# Patient Record
Sex: FEMALE | Race: BLACK OR AFRICAN AMERICAN | NOT HISPANIC OR LATINO | URBAN - METROPOLITAN AREA
[De-identification: names, ages, dates, MRNs, and addresses within clinical notes are randomized per-mention and may not be internally consistent; named-entity substitution may affect disease eponyms.]

---

## 2020-08-21 ENCOUNTER — OFFICE VISIT (OUTPATIENT)
Dept: URBAN - METROPOLITAN AREA CLINIC 84 | Facility: CLINIC | Age: 80
End: 2020-08-21
Payer: MEDICARE

## 2020-08-21 DIAGNOSIS — R10.13 EPIGASTRIC ABDOMINAL PAIN: ICD-10-CM

## 2020-08-21 DIAGNOSIS — K58.9 IBS (IRRITABLE BOWEL SYNDROME)-C: ICD-10-CM

## 2020-08-21 DIAGNOSIS — R68.81 EARLY SATIETY: ICD-10-CM

## 2020-08-21 PROCEDURE — 99213 OFFICE O/P EST LOW 20 MIN: CPT | Performed by: INTERNAL MEDICINE

## 2020-08-21 PROCEDURE — G9903 PT SCRN TBCO ID AS NON USER: HCPCS | Performed by: INTERNAL MEDICINE

## 2020-08-21 PROCEDURE — G8427 DOCREV CUR MEDS BY ELIG CLIN: HCPCS | Performed by: INTERNAL MEDICINE

## 2020-08-21 PROCEDURE — G8417 CALC BMI ABV UP PARAM F/U: HCPCS | Performed by: INTERNAL MEDICINE

## 2020-08-21 RX ORDER — SPIRONOLACTONE 25 MG/1
TABLET ORAL
Qty: 0 | Refills: 0 | Status: ACTIVE | COMMUNITY
Start: 1900-01-01 | End: 1900-01-01

## 2020-08-21 RX ORDER — PANTOPRAZOLE SODIUM 40 MG/1
TABLET, DELAYED RELEASE ORAL
Qty: 0 | Refills: 0 | Status: ACTIVE | COMMUNITY
Start: 1900-01-01 | End: 1900-01-01

## 2020-08-21 RX ORDER — LACTULOSE 10 G/15ML
TAKE 30 MILLILITERS BY ORAL ROUTE 4 TIMES A DAY AS NEEDED SOLUTION ORAL
Qty: 1800 | Refills: 5 | Status: ACTIVE | COMMUNITY
Start: 2020-05-15 | End: 1900-01-01

## 2020-08-21 RX ORDER — CARVEDILOL 12.5 MG/1
TABLET, FILM COATED ORAL
Qty: 0 | Refills: 0 | Status: ACTIVE | COMMUNITY
Start: 1900-01-01 | End: 1900-01-01

## 2020-08-21 RX ORDER — DULOXETINE 60 MG/1
CAPSULE, DELAYED RELEASE PELLETS ORAL
Qty: 0 | Refills: 0 | Status: ACTIVE | COMMUNITY
Start: 1900-01-01 | End: 1900-01-01

## 2020-08-21 RX ORDER — TIZANIDINE HYDROCHLORIDE 4 MG/1
CAPSULE ORAL
Qty: 0 | Refills: 0 | Status: ACTIVE | COMMUNITY
Start: 1900-01-01 | End: 1900-01-01

## 2020-08-21 NOTE — HPI-TODAY'S VISIT:
Patient seen 5/2020.  She completed XRT.  She is on Chemotherapy as well.  She has been using Lactulose 3-4 times a day.  She also uses Miralax daily.  In general she is having regular BM's.  She has a daily BM.  It sometimes gives her diarrhea.  She denies LGI bleed or melena.  She still feels very full after she eats.  She has early satiety.  She has been on intermittent narcotics, but she has not taken any recently.  Her GERD is under control on PPI therapy.  She denies N/V/dysphagia.  Her DM is poorly controlled

## 2020-09-01 ENCOUNTER — TELEPHONE ENCOUNTER (OUTPATIENT)
Dept: URBAN - METROPOLITAN AREA CLINIC 84 | Facility: CLINIC | Age: 80
End: 2020-09-01

## 2020-11-20 ENCOUNTER — OFFICE VISIT (OUTPATIENT)
Dept: URBAN - METROPOLITAN AREA CLINIC 84 | Facility: CLINIC | Age: 80
End: 2020-11-20
Payer: MEDICARE

## 2020-11-20 DIAGNOSIS — K31.84 GASTROPARESIS: ICD-10-CM

## 2020-11-20 DIAGNOSIS — K58.9 IBS (IRRITABLE BOWEL SYNDROME)-C: ICD-10-CM

## 2020-11-20 PROCEDURE — G9903 PT SCRN TBCO ID AS NON USER: HCPCS | Performed by: INTERNAL MEDICINE

## 2020-11-20 PROCEDURE — G8427 DOCREV CUR MEDS BY ELIG CLIN: HCPCS | Performed by: INTERNAL MEDICINE

## 2020-11-20 PROCEDURE — 99213 OFFICE O/P EST LOW 20 MIN: CPT | Performed by: INTERNAL MEDICINE

## 2020-11-20 PROCEDURE — G8483 FLU IMM NO ADMIN DOC REA: HCPCS | Performed by: INTERNAL MEDICINE

## 2020-11-20 PROCEDURE — G8417 CALC BMI ABV UP PARAM F/U: HCPCS | Performed by: INTERNAL MEDICINE

## 2020-11-20 RX ORDER — DULOXETINE 60 MG/1
CAPSULE, DELAYED RELEASE PELLETS ORAL
Qty: 0 | Refills: 0 | Status: ACTIVE | COMMUNITY
Start: 1900-01-01 | End: 1900-01-01

## 2020-11-20 RX ORDER — LACTULOSE 10 G/15ML
TAKE 30 MILLILITERS BY ORAL ROUTE 4 TIMES A DAY AS NEEDED SOLUTION ORAL
Qty: 1800 | Refills: 5 | Status: ACTIVE | COMMUNITY
Start: 2020-05-15 | End: 1900-01-01

## 2020-11-20 RX ORDER — CARVEDILOL 12.5 MG/1
TABLET, FILM COATED ORAL
Qty: 0 | Refills: 0 | Status: ACTIVE | COMMUNITY
Start: 1900-01-01 | End: 1900-01-01

## 2020-11-20 RX ORDER — SPIRONOLACTONE 25 MG/1
TABLET ORAL
Qty: 0 | Refills: 0 | Status: ACTIVE | COMMUNITY
Start: 1900-01-01 | End: 1900-01-01

## 2020-11-20 RX ORDER — PANTOPRAZOLE SODIUM 40 MG/1
TABLET, DELAYED RELEASE ORAL
Qty: 0 | Refills: 0 | Status: ACTIVE | COMMUNITY
Start: 1900-01-01 | End: 1900-01-01

## 2020-11-20 RX ORDER — METOCLOPRAMIDE HYDROCHLORIDE 10 MG/1
1 TABLET TABLET ORAL
Qty: 120 TABLET | Refills: 2 | OUTPATIENT
Start: 2020-11-20

## 2020-11-20 RX ORDER — TIZANIDINE HYDROCHLORIDE 4 MG/1
CAPSULE ORAL
Qty: 0 | Refills: 0 | Status: ACTIVE | COMMUNITY
Start: 1900-01-01 | End: 1900-01-01

## 2020-11-20 NOTE — HPI-TODAY'S VISIT:
Patient last seen 8/2020.  She has been following up with her Oncology.  According to the patient her cancer is "doing well."  She has post prandial pain and early satiety.  She has been trying the gastroparesis diet without much improvement.  She denies N/V/GERD/dysphagia.  She denies weight loss.  She is moving her bowels well with Lactulose and Miralax.  She denies LGI bleed or melena.  Her DM has not been well controlled.  She uses hydrocodone gagan a regular basis.  Review of her GES from 9/22/2020 shows delayed gastric emptying consistent with gastroparesis

## 2021-02-25 ENCOUNTER — WEB ENCOUNTER (OUTPATIENT)
Dept: URBAN - METROPOLITAN AREA CLINIC 84 | Facility: CLINIC | Age: 81
End: 2021-02-25

## 2021-02-25 ENCOUNTER — OFFICE VISIT (OUTPATIENT)
Dept: URBAN - METROPOLITAN AREA CLINIC 84 | Facility: CLINIC | Age: 81
End: 2021-02-25
Payer: MEDICARE

## 2021-02-25 VITALS
HEIGHT: 65 IN | WEIGHT: 254.2 LBS | TEMPERATURE: 97.5 F | DIASTOLIC BLOOD PRESSURE: 69 MMHG | HEART RATE: 86 BPM | BODY MASS INDEX: 42.35 KG/M2 | SYSTOLIC BLOOD PRESSURE: 148 MMHG

## 2021-02-25 DIAGNOSIS — K58.9 IBS (IRRITABLE BOWEL SYNDROME)-C: ICD-10-CM

## 2021-02-25 DIAGNOSIS — K31.84 GASTROPARESIS: ICD-10-CM

## 2021-02-25 PROCEDURE — 99213 OFFICE O/P EST LOW 20 MIN: CPT | Performed by: INTERNAL MEDICINE

## 2021-02-25 RX ORDER — CARVEDILOL 12.5 MG/1
TABLET, FILM COATED ORAL
Qty: 0 | Refills: 0 | Status: ACTIVE | COMMUNITY
Start: 1900-01-01

## 2021-02-25 RX ORDER — TIZANIDINE HYDROCHLORIDE 4 MG/1
CAPSULE ORAL
Qty: 0 | Refills: 0 | Status: ACTIVE | COMMUNITY
Start: 1900-01-01

## 2021-02-25 RX ORDER — PANTOPRAZOLE SODIUM 40 MG/1
TABLET, DELAYED RELEASE ORAL
Qty: 0 | Refills: 0 | Status: ACTIVE | COMMUNITY
Start: 1900-01-01

## 2021-02-25 RX ORDER — SPIRONOLACTONE 25 MG/1
TABLET ORAL
Qty: 0 | Refills: 0 | Status: ACTIVE | COMMUNITY
Start: 1900-01-01

## 2021-02-25 RX ORDER — LACTULOSE 10 G/15ML
TAKE 30 MILLILITERS BY ORAL ROUTE 4 TIMES A DAY AS NEEDED SOLUTION ORAL
Qty: 1800 | Refills: 5 | Status: ACTIVE | COMMUNITY
Start: 2020-05-15

## 2021-02-25 RX ORDER — METOCLOPRAMIDE HYDROCHLORIDE 10 MG/1
1 TABLET TABLET ORAL
Qty: 120 TABLET | Refills: 2 | Status: ACTIVE | COMMUNITY
Start: 2020-11-20

## 2021-02-25 RX ORDER — DULOXETINE 60 MG/1
CAPSULE, DELAYED RELEASE PELLETS ORAL
Qty: 0 | Refills: 0 | Status: ACTIVE | COMMUNITY
Start: 1900-01-01

## 2021-02-25 NOTE — HPI-OTHER HISTORIES
Patient last seen 11/2020.  She does not feel that the Reglan has been helpful.  She denies anroexia or weight loss.  She still has an epigastric pain that increases with PO intake.  She has early satiety.  The pain decrease if she does not eat a lot.  She denies GERD/N/V/dysphagia.  She uses Lactulose QID.  It does giver her diarrhea.  She does not use it daily. She ahs not tried to decrease the Lactulose dose to prevent the diarrhea.  She is on Protonix.  Per the patient her lung cancer is in remission.  She follows with Dr. Lao

## 2021-02-25 NOTE — PHYSICAL EXAM NECK/THYROID:
normal appearance , without tenderness upon palpation , no deformities , trachea midline , Thyroid normal size , no thyroid nodules , no masses , no JVD , thyroid nontender
6

## 2021-04-08 ENCOUNTER — ERX REFILL RESPONSE (OUTPATIENT)
Dept: URBAN - METROPOLITAN AREA CLINIC 84 | Facility: CLINIC | Age: 81
End: 2021-04-08

## 2021-04-08 RX ORDER — METOCLOPRAMIDE 10 MG/1
1 TABLET TABLET ORAL
Qty: 120 | Refills: 2

## 2021-05-03 ENCOUNTER — TELEPHONE ENCOUNTER (OUTPATIENT)
Dept: URBAN - METROPOLITAN AREA CLINIC 92 | Facility: CLINIC | Age: 81
End: 2021-05-03

## 2021-06-25 ENCOUNTER — WEB ENCOUNTER (OUTPATIENT)
Dept: URBAN - METROPOLITAN AREA CLINIC 84 | Facility: CLINIC | Age: 81
End: 2021-06-25

## 2021-06-25 ENCOUNTER — OFFICE VISIT (OUTPATIENT)
Dept: URBAN - METROPOLITAN AREA CLINIC 84 | Facility: CLINIC | Age: 81
End: 2021-06-25
Payer: MEDICARE

## 2021-06-25 VITALS
HEART RATE: 60 BPM | DIASTOLIC BLOOD PRESSURE: 78 MMHG | RESPIRATION RATE: 28 BRPM | HEIGHT: 65 IN | BODY MASS INDEX: 40.82 KG/M2 | TEMPERATURE: 97.3 F | SYSTOLIC BLOOD PRESSURE: 166 MMHG | WEIGHT: 245 LBS

## 2021-06-25 DIAGNOSIS — K31.84 GASTROPARESIS: ICD-10-CM

## 2021-06-25 DIAGNOSIS — K58.9 IBS (IRRITABLE BOWEL SYNDROME)-C: ICD-10-CM

## 2021-06-25 DIAGNOSIS — R10.13 EPIGASTRIC ABDOMINAL PAIN: ICD-10-CM

## 2021-06-25 PROCEDURE — 99213 OFFICE O/P EST LOW 20 MIN: CPT | Performed by: INTERNAL MEDICINE

## 2021-06-25 RX ORDER — CARVEDILOL 12.5 MG/1
TABLET, FILM COATED ORAL
Qty: 0 | Refills: 0 | Status: ACTIVE | COMMUNITY
Start: 1900-01-01

## 2021-06-25 RX ORDER — PANTOPRAZOLE SODIUM 40 MG/1
TABLET, DELAYED RELEASE ORAL
Qty: 0 | Refills: 0 | Status: ACTIVE | COMMUNITY
Start: 1900-01-01

## 2021-06-25 RX ORDER — DICYCLOMINE HYDROCHLORIDE 20 MG/1
1- 2 TABLETS TABLET ORAL
Qty: 60 | Refills: 2 | OUTPATIENT
Start: 2021-06-25 | End: 2021-09-23

## 2021-06-25 RX ORDER — LACTULOSE 10 G/15ML
TAKE 30 MILLILITERS BY ORAL ROUTE 4 TIMES A DAY AS NEEDED SOLUTION ORAL
Qty: 1800 | Refills: 5 | Status: ACTIVE | COMMUNITY
Start: 2020-05-15

## 2021-06-25 RX ORDER — METOCLOPRAMIDE 10 MG/1
1 TABLET TABLET ORAL
Qty: 120 | Refills: 2 | Status: ACTIVE | COMMUNITY

## 2021-06-25 RX ORDER — DULOXETINE 60 MG/1
CAPSULE, DELAYED RELEASE PELLETS ORAL
Qty: 0 | Refills: 0 | Status: ACTIVE | COMMUNITY
Start: 1900-01-01

## 2021-06-25 RX ORDER — SPIRONOLACTONE 25 MG/1
TABLET ORAL
Qty: 0 | Refills: 0 | Status: ACTIVE | COMMUNITY
Start: 1900-01-01

## 2021-06-25 RX ORDER — TIZANIDINE HYDROCHLORIDE 4 MG/1
CAPSULE ORAL
Qty: 0 | Refills: 0 | Status: ACTIVE | COMMUNITY
Start: 1900-01-01

## 2021-06-25 NOTE — HPI-TODAY'S VISIT:
Patient last seen 2/2021.  She has some epiagstric pain.  She is tkaing Omeprazole BID.   She has an appetite.  She is trying to follow the gastroparesis diet.  She does not really feel like it has bene helpfu.  She denies GERD/N/V/dysphagia.  She is no longer taking Lactulose.  She uses Miralax every other day.  She moves her bowels well with this.  She denies LGI bleed or melena.

## 2021-07-07 ENCOUNTER — ERX REFILL RESPONSE (OUTPATIENT)
Dept: URBAN - METROPOLITAN AREA CLINIC 84 | Facility: CLINIC | Age: 81
End: 2021-07-07

## 2021-07-07 RX ORDER — DICYCLOMINE HYDROCHLORIDE 20 MG/1
1- 2 TABLETS ORALLY FOUR TIMES A DAY AS NEEDED 30 DAY(S) TABLET ORAL
Qty: 180 TABLET | Refills: 1 | OUTPATIENT

## 2021-07-07 RX ORDER — DICYCLOMINE HYDROCHLORIDE 20 MG/1
1- 2 TABLETS TABLET ORAL
Qty: 60 | Refills: 2 | OUTPATIENT

## 2021-08-06 ENCOUNTER — ERX REFILL RESPONSE (OUTPATIENT)
Dept: URBAN - METROPOLITAN AREA CLINIC 84 | Facility: CLINIC | Age: 81
End: 2021-08-06

## 2021-08-06 RX ORDER — DICYCLOMINE HYDROCHLORIDE 20 MG/1
1- 2 TABLETS ORALLY FOUR TIMES A DAY AS NEEDED 30 DAY(S) TABLET ORAL
Qty: 180 TABLET | Refills: 2 | OUTPATIENT

## 2021-08-06 RX ORDER — DICYCLOMINE HYDROCHLORIDE 20 MG/1
1- 2 TABLETS ORALLY FOUR TIMES A DAY AS NEEDED 30 DAY(S) TABLET ORAL
Qty: 180 TABLET | Refills: 1 | OUTPATIENT

## 2021-10-28 ENCOUNTER — OFFICE VISIT (OUTPATIENT)
Dept: URBAN - METROPOLITAN AREA CLINIC 84 | Facility: CLINIC | Age: 81
End: 2021-10-28
Payer: MEDICARE

## 2021-10-28 ENCOUNTER — WEB ENCOUNTER (OUTPATIENT)
Dept: URBAN - METROPOLITAN AREA CLINIC 84 | Facility: CLINIC | Age: 81
End: 2021-10-28

## 2021-10-28 DIAGNOSIS — K58.9 IBS (IRRITABLE BOWEL SYNDROME)-C: ICD-10-CM

## 2021-10-28 DIAGNOSIS — R10.13 EPIGASTRIC ABDOMINAL PAIN: ICD-10-CM

## 2021-10-28 DIAGNOSIS — K31.84 GASTROPARESIS: ICD-10-CM

## 2021-10-28 PROCEDURE — 99213 OFFICE O/P EST LOW 20 MIN: CPT | Performed by: INTERNAL MEDICINE

## 2021-10-28 RX ORDER — DICYCLOMINE HYDROCHLORIDE 20 MG/1
1- 2 TABLETS ORALLY FOUR TIMES A DAY AS NEEDED 30 DAY(S) TABLET ORAL
Qty: 180 TABLET | Refills: 2

## 2021-10-28 RX ORDER — TIZANIDINE HYDROCHLORIDE 4 MG/1
CAPSULE ORAL
Qty: 0 | Refills: 0 | Status: ACTIVE | COMMUNITY
Start: 1900-01-01

## 2021-10-28 RX ORDER — DULOXETINE 60 MG/1
CAPSULE, DELAYED RELEASE PELLETS ORAL
Qty: 0 | Refills: 0 | Status: ACTIVE | COMMUNITY
Start: 1900-01-01

## 2021-10-28 RX ORDER — LACTULOSE 10 G/15ML
15 ML SOLUTION ORAL
Qty: 1800 MILLILITER | Refills: 5 | OUTPATIENT
Start: 2021-10-28 | End: 2022-04-26

## 2021-10-28 RX ORDER — LACTULOSE 10 G/15ML
TAKE 30 MILLILITERS BY ORAL ROUTE 4 TIMES A DAY AS NEEDED SOLUTION ORAL
Qty: 1800 | Refills: 5 | Status: ACTIVE | COMMUNITY
Start: 2020-05-15

## 2021-10-28 RX ORDER — PANTOPRAZOLE SODIUM 40 MG/1
TABLET, DELAYED RELEASE ORAL
Qty: 0 | Refills: 0 | Status: ACTIVE | COMMUNITY
Start: 1900-01-01

## 2021-10-28 RX ORDER — SPIRONOLACTONE 25 MG/1
TABLET ORAL
Qty: 0 | Refills: 0 | Status: ACTIVE | COMMUNITY
Start: 1900-01-01

## 2021-10-28 RX ORDER — METOCLOPRAMIDE 10 MG/1
1 TABLET TABLET ORAL
Qty: 120 | Refills: 2 | Status: ON HOLD | COMMUNITY

## 2021-10-28 RX ORDER — LISINOPRIL AND HYDROCHLOROTHIAZIDE 10; 12.5 MG/1; MG/1
1 TABLET TABLET ORAL ONCE A DAY
Status: ACTIVE | COMMUNITY

## 2021-10-28 RX ORDER — CARVEDILOL 12.5 MG/1
TABLET, FILM COATED ORAL
Qty: 0 | Refills: 0 | Status: ACTIVE | COMMUNITY
Start: 1900-01-01

## 2021-10-28 RX ORDER — DICYCLOMINE HYDROCHLORIDE 20 MG/1
1- 2 TABLETS ORALLY FOUR TIMES A DAY AS NEEDED 30 DAY(S) TABLET ORAL
Qty: 180 TABLET | Refills: 2 | Status: ACTIVE | COMMUNITY

## 2021-10-28 NOTE — HPI-TODAY'S VISIT:
Patient seen 6/2021.  She has lost a lot of weight.  She is getting Chemo for her lung cancer.  She has an appetite.  She has diffuse body pains.  She uses Miralax daily but she ahs been having recurrent constipaiton.  SHe has a BM every 2-3 days.  SHe denies LGI bleed or melena.  She ran out of the Lactulose.  It was helpful when she used it.  She is still on PPI therapy. She uses Bentyl PRN.   She denies GERD/N/V/dysphagia.  She follows with Dr. Lao

## 2022-01-03 ENCOUNTER — ERX REFILL RESPONSE (OUTPATIENT)
Dept: URBAN - METROPOLITAN AREA CLINIC 84 | Facility: CLINIC | Age: 82
End: 2022-01-03

## 2022-01-03 RX ORDER — DICYCLOMINE HYDROCHLORIDE 20 MG/1
TAKE 1 TO 2 TABLETS FOUR TIMES DAILY AS NEEDED TABLET ORAL
Qty: 180 TABLET | Refills: 1 | OUTPATIENT

## 2022-01-03 RX ORDER — DICYCLOMINE HYDROCHLORIDE 20 MG/1
1- 2 TABLETS ORALLY FOUR TIMES A DAY AS NEEDED 30 DAY(S) TABLET ORAL
Qty: 180 TABLET | Refills: 2 | OUTPATIENT

## 2022-03-17 ENCOUNTER — TELEPHONE ENCOUNTER (OUTPATIENT)
Dept: URBAN - METROPOLITAN AREA CLINIC 84 | Facility: CLINIC | Age: 82
End: 2022-03-17

## 2022-03-17 RX ORDER — DICYCLOMINE HYDROCHLORIDE 20 MG/1
TAKE 1 TO 2 TABLETS FOUR TIMES DAILY AS NEEDED TABLET ORAL
Qty: 60 | Refills: 5

## 2022-05-19 ENCOUNTER — ERX REFILL RESPONSE (OUTPATIENT)
Dept: URBAN - METROPOLITAN AREA CLINIC 84 | Facility: CLINIC | Age: 82
End: 2022-05-19

## 2022-05-19 RX ORDER — DICYCLOMINE HYDROCHLORIDE 20 MG/1
TAKE 1 TO 2 TABLETS FOUR TIMES DAILY AS NEEDED TABLET ORAL
Qty: 60 TABLET | Refills: 1 | OUTPATIENT

## 2022-05-19 RX ORDER — DICYCLOMINE HYDROCHLORIDE 20 MG/1
TAKE 1 TO 2 TABLETS FOUR TIMES DAILY AS NEEDED TABLET ORAL
Qty: 60 | Refills: 5 | OUTPATIENT

## 2022-06-08 ENCOUNTER — ERX REFILL RESPONSE (OUTPATIENT)
Dept: URBAN - METROPOLITAN AREA CLINIC 84 | Facility: CLINIC | Age: 82
End: 2022-06-08

## 2022-06-08 RX ORDER — DICYCLOMINE HYDROCHLORIDE 20 MG/1
TAKE 1 TO 2 TABLETS FOUR TIMES DAILY AS NEEDED TABLET ORAL
Qty: 60 TABLET | Refills: 1 | OUTPATIENT

## 2022-06-27 ENCOUNTER — ERX REFILL RESPONSE (OUTPATIENT)
Dept: URBAN - METROPOLITAN AREA CLINIC 84 | Facility: CLINIC | Age: 82
End: 2022-06-27

## 2022-06-27 RX ORDER — DICYCLOMINE HYDROCHLORIDE 20 MG/1
TAKE 1 TO 2 TABLETS FOUR TIMES DAILY AS NEEDED TABLET ORAL
Qty: 60 TABLET | Refills: 0 | OUTPATIENT

## 2022-06-27 RX ORDER — DICYCLOMINE HYDROCHLORIDE 20 MG/1
TAKE 1 TO 2 TABLETS FOUR TIMES DAILY AS NEEDED TABLET ORAL
Qty: 60 TABLET | Refills: 1 | OUTPATIENT

## 2022-07-06 ENCOUNTER — ERX REFILL RESPONSE (OUTPATIENT)
Dept: URBAN - METROPOLITAN AREA CLINIC 84 | Facility: CLINIC | Age: 82
End: 2022-07-06

## 2022-07-06 RX ORDER — LACTULOSE 10 G/15ML
TAKE 15 MILLILITERS FOUR TIMES DAILY AS NEEDED SOLUTION ORAL
Qty: 1800 MILLILITER | Refills: 0 | OUTPATIENT

## 2022-07-06 RX ORDER — LACTULOSE 10 G/15ML
TAKE 30 MILLILITERS BY ORAL ROUTE 4 TIMES A DAY AS NEEDED SOLUTION ORAL
Qty: 1800 | Refills: 5 | OUTPATIENT

## 2022-07-21 ENCOUNTER — OFFICE VISIT (OUTPATIENT)
Dept: URBAN - METROPOLITAN AREA CLINIC 84 | Facility: CLINIC | Age: 82
End: 2022-07-21
Payer: MEDICARE

## 2022-07-21 ENCOUNTER — DASHBOARD ENCOUNTERS (OUTPATIENT)
Age: 82
End: 2022-07-21

## 2022-07-21 VITALS
HEIGHT: 65 IN | WEIGHT: 231.6 LBS | DIASTOLIC BLOOD PRESSURE: 66 MMHG | SYSTOLIC BLOOD PRESSURE: 148 MMHG | HEART RATE: 61 BPM | BODY MASS INDEX: 38.59 KG/M2 | TEMPERATURE: 97.5 F

## 2022-07-21 DIAGNOSIS — R10.13 EPIGASTRIC ABDOMINAL PAIN: ICD-10-CM

## 2022-07-21 DIAGNOSIS — K31.84 GASTROPARESIS: ICD-10-CM

## 2022-07-21 DIAGNOSIS — K58.9 IBS (IRRITABLE BOWEL SYNDROME)-C: ICD-10-CM

## 2022-07-21 PROCEDURE — 99213 OFFICE O/P EST LOW 20 MIN: CPT | Performed by: INTERNAL MEDICINE

## 2022-07-21 RX ORDER — SPIRONOLACTONE 25 MG/1
TABLET ORAL
Qty: 0 | Refills: 0 | Status: ACTIVE | COMMUNITY
Start: 1900-01-01

## 2022-07-21 RX ORDER — LACTULOSE 10 G/15ML
TAKE 15 MILLILITERS FOUR TIMES DAILY AS NEEDED SOLUTION ORAL
Qty: 1800 MILLILITER | Refills: 0 | Status: ACTIVE | COMMUNITY

## 2022-07-21 RX ORDER — METOCLOPRAMIDE 10 MG/1
1 TABLET TABLET ORAL
Qty: 120 | Refills: 2 | Status: ON HOLD | COMMUNITY

## 2022-07-21 RX ORDER — PEMBROLIZUMAB 25 MG/ML
AS DIRECTED INJECTION, SOLUTION INTRAVENOUS
Status: ACTIVE | COMMUNITY

## 2022-07-21 RX ORDER — DICYCLOMINE HYDROCHLORIDE 20 MG/1
TAKE 1 TO 2 TABLETS FOUR TIMES DAILY AS NEEDED TABLET ORAL
Qty: 60 TABLET | Refills: 0 | Status: ACTIVE | COMMUNITY

## 2022-07-21 NOTE — HPI-TODAY'S VISIT:
Patient seen about 7 months ago.  She uses Lactulose daily and also uses Miralax PRN.  Overall her bowels are moving well.  She denies LGI bleed.  She uses Bentyl PRN with mild improvement.  She denies anroexia or weight loss.  She takes Omeprazole daily.  She denies GERD/N/V.  She still gets Chemo.  Overall she is satisfied with her GI symptoms

## 2022-07-28 PROBLEM — 235675006 GASTROPARESIS: Status: ACTIVE | Noted: 2020-11-20

## 2022-07-28 PROBLEM — 10743008 IRRITABLE BOWEL SYNDROME: Status: ACTIVE | Noted: 2020-11-20

## 2022-08-31 ENCOUNTER — ERX REFILL RESPONSE (OUTPATIENT)
Dept: URBAN - METROPOLITAN AREA CLINIC 84 | Facility: CLINIC | Age: 82
End: 2022-08-31

## 2022-08-31 RX ORDER — LACTULOSE 10 G/15ML
TAKE 15 MILLILITERS FOUR TIMES DAILY AS NEEDED SOLUTION ORAL
Qty: 1800 MILLILITER | Refills: 0 | OUTPATIENT

## 2022-09-26 ENCOUNTER — TELEPHONE ENCOUNTER (OUTPATIENT)
Dept: URBAN - METROPOLITAN AREA CLINIC 84 | Facility: CLINIC | Age: 82
End: 2022-09-26

## 2022-09-26 RX ORDER — DICYCLOMINE HYDROCHLORIDE 20 MG/1
TAKE 1 TO 2 TABLETS FOUR TIMES DAILY AS NEEDED TABLET ORAL
Qty: 90 | Refills: 3

## 2022-10-19 ENCOUNTER — ERX REFILL RESPONSE (OUTPATIENT)
Dept: URBAN - METROPOLITAN AREA CLINIC 84 | Facility: CLINIC | Age: 82
End: 2022-10-19

## 2022-10-19 RX ORDER — LACTULOSE 10 G/15ML
TAKE 15 ML FOUR TIMES DAILY AS NEEDED SOLUTION ORAL
Qty: 1800 MILLILITER | Refills: 0 | OUTPATIENT

## 2022-10-19 RX ORDER — LACTULOSE 10 G/15ML
TAKE 15 MILLILITERS FOUR TIMES DAILY AS NEEDED SOLUTION ORAL
Qty: 1800 MILLILITER | Refills: 0 | OUTPATIENT

## 2022-11-15 ENCOUNTER — ERX REFILL RESPONSE (OUTPATIENT)
Dept: URBAN - METROPOLITAN AREA CLINIC 84 | Facility: CLINIC | Age: 82
End: 2022-11-15

## 2022-11-15 RX ORDER — DICYCLOMINE HYDROCHLORIDE 20 MG/1
TAKE 1 TO 2 TABLETS FOUR TIMES DAILY AS NEEDED TABLET ORAL
Qty: 90 TABLET | Refills: 0 | OUTPATIENT

## 2022-11-15 RX ORDER — DICYCLOMINE HYDROCHLORIDE 20 MG/1
TAKE 1 TO 2 TABLETS FOUR TIMES DAILY AS NEEDED TABLET ORAL
Qty: 90 | Refills: 3 | OUTPATIENT

## 2022-12-06 ENCOUNTER — ERX REFILL RESPONSE (OUTPATIENT)
Dept: URBAN - METROPOLITAN AREA CLINIC 84 | Facility: CLINIC | Age: 82
End: 2022-12-06

## 2022-12-06 RX ORDER — DICYCLOMINE HYDROCHLORIDE 20 MG/1
TAKE 1 TO 2 TABLETS FOUR TIMES DAILY AS NEEDED TABLET ORAL
Qty: 90 TABLET | Refills: 0 | OUTPATIENT

## 2022-12-09 ENCOUNTER — ERX REFILL RESPONSE (OUTPATIENT)
Dept: URBAN - METROPOLITAN AREA CLINIC 84 | Facility: CLINIC | Age: 82
End: 2022-12-09

## 2022-12-09 RX ORDER — LACTULOSE 10 G/15ML
TAKE 15 ML FOUR TIMES DAILY AS NEEDED SOLUTION ORAL
Qty: 1800 MILLILITER | Refills: 0 | OUTPATIENT

## 2023-03-08 ENCOUNTER — ERX REFILL RESPONSE (OUTPATIENT)
Dept: URBAN - METROPOLITAN AREA CLINIC 84 | Facility: CLINIC | Age: 83
End: 2023-03-08

## 2023-03-08 RX ORDER — LACTULOSE 10 G/15ML
TAKE 15 ML FOUR TIMES DAILY AS NEEDED SOLUTION ORAL
Qty: 1800 MILLILITER | Refills: 0 | OUTPATIENT

## 2023-05-01 ENCOUNTER — ERX REFILL RESPONSE (OUTPATIENT)
Dept: URBAN - METROPOLITAN AREA CLINIC 84 | Facility: CLINIC | Age: 83
End: 2023-05-01

## 2023-05-01 RX ORDER — LACTULOSE 10 G/15ML
TAKE 15 ML FOUR TIMES DAILY AS NEEDED SOLUTION ORAL
Qty: 1800 MILLILITER | Refills: 0 | OUTPATIENT

## 2023-07-03 ENCOUNTER — ERX REFILL RESPONSE (OUTPATIENT)
Dept: URBAN - METROPOLITAN AREA CLINIC 84 | Facility: CLINIC | Age: 83
End: 2023-07-03

## 2023-07-03 RX ORDER — LACTULOSE 10 G/15ML
TAKE 15 ML FOUR TIMES DAILY AS NEEDED SOLUTION ORAL
Qty: 1800 MILLILITER | Refills: 0 | OUTPATIENT

## 2023-08-31 ENCOUNTER — ERX REFILL RESPONSE (OUTPATIENT)
Dept: URBAN - METROPOLITAN AREA CLINIC 84 | Facility: CLINIC | Age: 83
End: 2023-08-31

## 2023-08-31 RX ORDER — LACTULOSE 10 G/15ML
TAKE 15 ML FOUR TIMES DAILY AS NEEDED SOLUTION ORAL
Qty: 1800 MILLILITER | Refills: 0 | OUTPATIENT

## 2023-10-23 ENCOUNTER — ERX REFILL RESPONSE (OUTPATIENT)
Dept: URBAN - METROPOLITAN AREA CLINIC 84 | Facility: CLINIC | Age: 83
End: 2023-10-23

## 2023-10-23 RX ORDER — LACTULOSE 10 G/15ML
TAKE 15 ML FOUR TIMES DAILY AS NEEDED SOLUTION ORAL
Qty: 1800 MILLILITER | Refills: 0 | OUTPATIENT

## 2023-10-23 RX ORDER — LACTULOSE 10 G/15ML
30 ML SOLUTION ORAL
Qty: 1800 MILLILITER | Refills: 5 | OUTPATIENT

## 2024-10-10 ENCOUNTER — OFFICE VISIT (OUTPATIENT)
Dept: URBAN - METROPOLITAN AREA CLINIC 84 | Facility: CLINIC | Age: 84
End: 2024-10-10

## 2024-10-15 ENCOUNTER — TELEPHONE ENCOUNTER (OUTPATIENT)
Dept: URBAN - METROPOLITAN AREA CLINIC 84 | Facility: CLINIC | Age: 84
End: 2024-10-15

## 2024-10-15 ENCOUNTER — OFFICE VISIT (OUTPATIENT)
Dept: URBAN - METROPOLITAN AREA CLINIC 84 | Facility: CLINIC | Age: 84
End: 2024-10-15
Payer: MEDICARE

## 2024-10-15 VITALS
HEIGHT: 65 IN | HEART RATE: 57 BPM | WEIGHT: 215.6 LBS | BODY MASS INDEX: 35.92 KG/M2 | DIASTOLIC BLOOD PRESSURE: 61 MMHG | SYSTOLIC BLOOD PRESSURE: 156 MMHG | TEMPERATURE: 97.1 F

## 2024-10-15 DIAGNOSIS — K31.84 GASTROPARESIS: ICD-10-CM

## 2024-10-15 DIAGNOSIS — D50.0 IRON DEFICIENCY ANEMIA DUE TO CHRONIC BLOOD LOSS: ICD-10-CM

## 2024-10-15 DIAGNOSIS — K58.1 IRRITABLE BOWEL SYNDROME WITH CONSTIPATION: ICD-10-CM

## 2024-10-15 PROCEDURE — 99214 OFFICE O/P EST MOD 30 MIN: CPT | Performed by: INTERNAL MEDICINE

## 2024-10-15 RX ORDER — LACTULOSE 10 G/15ML
TAKE 15 ML SOLUTION ORAL
Qty: 450 | Refills: 5

## 2024-10-15 NOTE — HPI-TODAY'S VISIT:
Patient seen 7/2022.  There has been no change in her PMHx/PSHx.  Her cancer has been stable.  She is on oral Chemotx.  Overall she feels well.  She no longer uses Lactulose.  She takes Senna.  She has a daily BM with the Senna.  She denies overt GI Bleed.  She denies abdominal pain.  She denies UGI symptoms. She takes Omeprazole daily.  She denies AC therapy. She has an ARABELLA and seh was recently given an Fe infusion.  She did not need a blood transfusion.  She has a new Oncologist but she is not sure of the name